# Patient Record
Sex: MALE | Race: WHITE | NOT HISPANIC OR LATINO | Employment: FULL TIME | URBAN - METROPOLITAN AREA
[De-identification: names, ages, dates, MRNs, and addresses within clinical notes are randomized per-mention and may not be internally consistent; named-entity substitution may affect disease eponyms.]

---

## 2021-05-13 ENCOUNTER — OFFICE VISIT (OUTPATIENT)
Dept: URGENT CARE | Facility: PHYSICIAN GROUP | Age: 41
End: 2021-05-13
Payer: COMMERCIAL

## 2021-05-13 VITALS
DIASTOLIC BLOOD PRESSURE: 82 MMHG | OXYGEN SATURATION: 97 % | HEIGHT: 68 IN | BODY MASS INDEX: 27.89 KG/M2 | WEIGHT: 184 LBS | TEMPERATURE: 98.5 F | RESPIRATION RATE: 16 BRPM | HEART RATE: 84 BPM | SYSTOLIC BLOOD PRESSURE: 118 MMHG

## 2021-05-13 DIAGNOSIS — R22.0 LEFT FACIAL SWELLING: ICD-10-CM

## 2021-05-13 DIAGNOSIS — K02.9 CHRONIC DENTAL CARIES EXTENDING TO DENTINE: ICD-10-CM

## 2021-05-13 DIAGNOSIS — K04.7 DENTAL INFECTION: ICD-10-CM

## 2021-05-13 PROCEDURE — 99204 OFFICE O/P NEW MOD 45 MIN: CPT | Performed by: NURSE PRACTITIONER

## 2021-05-13 RX ORDER — IBUPROFEN 600 MG/1
600 TABLET ORAL 3 TIMES DAILY PRN
COMMUNITY
Start: 2021-04-13

## 2021-05-13 RX ORDER — CLINDAMYCIN HYDROCHLORIDE 300 MG/1
300 CAPSULE ORAL 3 TIMES DAILY
Qty: 21 CAPSULE | Refills: 0 | Status: SHIPPED | OUTPATIENT
Start: 2021-05-13 | End: 2021-05-20

## 2021-05-13 RX ORDER — PENICILLIN V POTASSIUM 500 MG/1
TABLET ORAL
COMMUNITY
Start: 2021-05-11

## 2021-05-13 ASSESSMENT — ENCOUNTER SYMPTOMS
ORTHOPNEA: 0
TINGLING: 0
VOMITING: 0
DIZZINESS: 0
MEMORY LOSS: 0
WHEEZING: 0
CHILLS: 0
PALPITATIONS: 0
COUGH: 0
BACK PAIN: 0
SORE THROAT: 0
CONSTIPATION: 0
NAUSEA: 0
SINUS PRESSURE: 0
FEVER: 0
DIARRHEA: 0
SHORTNESS OF BREATH: 0
HEARTBURN: 0
MYALGIAS: 0
HEADACHES: 0

## 2021-05-13 NOTE — PROGRESS NOTES
Subjective:      Trevin Pérez is a 40 y.o. male who presents with Jaw Pain (Pt reports left lower jaw swollen. Pt sts it might be his tooh. Pt sts he was seen via virtual and was given PCN 500mg and has been tkaing it for the last 3 days with no benefit. )          Is a pleasant 40-year-old man who presents today with complaint of left lower jaw swelling and dental pain.  Patient has poor dentition.  He has no established dentist at this time however he is working on [employer].  He did a teledoc visit and started on amoxicillin 3 days ago and has had no improvement.  He presents for evaluation today.  He denies any fevers, chills, nausea, vomiting.  Dental Pain   This is a new problem. Episode onset: x 3 days. The problem occurs constantly. The problem has been rapidly worsening. The pain is at a severity of 7/10. The pain is moderate. Associated symptoms include facial pain and thermal sensitivity. Pertinent negatives include no difficulty swallowing, fever, oral bleeding or sinus pressure. He has tried NSAIDs (Amoxicillin) for the symptoms. The treatment provided no relief.       Review of Systems   Constitutional: Negative for chills and fever.   HENT: Negative for ear pain, sinus pressure and sore throat.         +Dental Pain L lower  +Facial swelling   Respiratory: Negative for cough, shortness of breath and wheezing.    Cardiovascular: Negative for chest pain, palpitations, orthopnea and leg swelling.   Gastrointestinal: Negative for constipation, diarrhea, heartburn, nausea and vomiting.   Musculoskeletal: Negative for back pain, joint pain and myalgias.   Skin: Negative for rash.   Neurological: Negative for dizziness, tingling and headaches.   Psychiatric/Behavioral: Negative for memory loss and suicidal ideas.   All other systems reviewed and are negative.         Objective:     /82 (BP Location: Left arm, Patient Position: Sitting, BP Cuff Size: Adult)   Pulse 84   Temp 36.9 °C (98.5 °F)  "(Temporal)   Resp 16   Ht 1.727 m (5' 8\")   Wt 83.5 kg (184 lb)   SpO2 97%   BMI 27.98 kg/m²      Physical Exam  Vitals reviewed.   Constitutional:       Appearance: Normal appearance.   HENT:      Head: Normocephalic.      Jaw: There is normal jaw occlusion. Swelling present.        Right Ear: External ear normal.      Left Ear: External ear normal.      Nose: Nose normal. No congestion.      Mouth/Throat:      Lips: Pink.      Mouth: Mucous membranes are moist.      Dentition: Abnormal dentition. Dental tenderness, gingival swelling and dental caries present.      Tongue: No lesions. Tongue does not deviate from midline.      Pharynx: Uvula midline. No uvula swelling.   Eyes:      Extraocular Movements: Extraocular movements intact.      Conjunctiva/sclera: Conjunctivae normal.   Cardiovascular:      Rate and Rhythm: Normal rate and regular rhythm.      Pulses: Normal pulses.      Heart sounds: Normal heart sounds. No murmur heard.     Pulmonary:      Effort: Pulmonary effort is normal.      Breath sounds: Normal breath sounds. No wheezing.   Abdominal:      General: Abdomen is flat. Bowel sounds are normal.      Palpations: Abdomen is soft.      Tenderness: There is no abdominal tenderness.   Musculoskeletal:         General: Normal range of motion.      Cervical back: Normal range of motion.   Lymphadenopathy:      Cervical: No cervical adenopathy.   Skin:     General: Skin is warm and dry.      Capillary Refill: Capillary refill takes less than 2 seconds.   Neurological:      Mental Status: He is alert and oriented to person, place, and time.   Psychiatric:         Mood and Affect: Mood normal.         Behavior: Behavior normal.                        Assessment/Plan:         1. Dental infection  clindamycin (CLEOCIN) 300 MG Cap   2. Left facial swelling  clindamycin (CLEOCIN) 300 MG Cap   3. Chronic dental caries extending to dentine     Is a chronic problem with acute exacerbation.  Patient has known " frequent dental infections and currently does not have a dentist.  He is trying to establish.  He will follow-up as soon as possible.  He failed amoxicillin so we will try clindamycin today.  Continue ibuprofen 400 to 600 mg 3 times daily.    Supportive care, differential diagnoses, and indications for immediate follow-up discussed with patient.    Pathogenesis of diagnosis discussed including typical length and natural progression. Patient expresses understanding and agrees to plan.     Instructed patient to follow up with PCP or return to clinic for worsening symptoms or symptoms that persist for 7 to 10 days     Please note that this dictation was created using voice recognition software. I have made every reasonable attempt to correct obvious errors, but I expect that there are errors of grammar and possibly content that I did not discover before finalizing the note.